# Patient Record
Sex: FEMALE | Race: ASIAN
[De-identification: names, ages, dates, MRNs, and addresses within clinical notes are randomized per-mention and may not be internally consistent; named-entity substitution may affect disease eponyms.]

---

## 2017-10-30 ENCOUNTER — HOSPITAL ENCOUNTER (EMERGENCY)
Dept: HOSPITAL 74 - FER | Age: 42
Discharge: HOME | End: 2017-10-30
Payer: COMMERCIAL

## 2017-10-30 VITALS — BODY MASS INDEX: 34.9 KG/M2

## 2017-10-30 VITALS — DIASTOLIC BLOOD PRESSURE: 88 MMHG | SYSTOLIC BLOOD PRESSURE: 138 MMHG | TEMPERATURE: 99.1 F | HEART RATE: 81 BPM

## 2017-10-30 DIAGNOSIS — M54.30: Primary | ICD-10-CM

## 2017-10-30 DIAGNOSIS — F17.210: ICD-10-CM

## 2017-10-30 DIAGNOSIS — G89.29: ICD-10-CM

## 2017-10-30 LAB
COLOR UR: YELLOW
PH UR: 5.5 [PH] (ref 4.5–8)
SP GR UR: <= 1.005 (ref 1–1.02)
UROBILINOGEN UR STRIP-MCNC: 0.2 MG/DL (ref 0.2–1)

## 2017-10-30 NOTE — PDOC
History of Present Illness





- General


Chief Complaint: Pain, Acute


Stated Complaint: LOW BACK PAIN


Time Seen by Provider: 10/30/17 09:46


History Source: Patient


Exam Limitations: No Limitations





- History of Present Illness


Initial Comments: 





10/30/17 10:35


42 F with h/o low back pain / injury intermittent every few months, here today c

/o severe low back pain radiating down through buttock. no known injury, 

believes she strained it mopping and sleeping in a different bed. no new 

numbness or tingling. no bowel or bladder incontinence. no f/c no urinary 

complaints. pain severe. mild improvement with 400 mg motrin at home.  no other 

complaints. has had imaging many years ago and was told she has disc disease in 

low back.





Past History





- Past Medical History


Allergies/Adverse Reactions: 


 Allergies











Allergy/AdvReac Type Severity Reaction Status Date / Time


 


No Known Allergies Allergy   Verified 10/30/17 09:46











Home Medications: 


Ambulatory Orders





No Home Medications 0 dose .ROUTE UTDICT 04/23/14 


Diazepam [Valium] 5 mg PO Q8H PRN #10 tablet MDD 3 10/30/17 


Ibuprofen [Motrin -] 600 mg PO TID #90 tablet 10/30/17 











- Suicide/Smoking/Psychosocial Hx


Smoking History: Current every day smoker


Number of Cigarettes Smoked Daily: 5


Information on smoking cessation initiated: Yes


'Breaking Loose' booklet given: 10/30/17


Hx Alcohol Use: No


Drug/Substance Use Hx: No


Substance Use Type: None





**Review of Systems





- Review of Systems


Constitutional: No: Chills, Diaphoresis, Fever


Respiratory: No: Cough, Orthopnea


Cardiac (ROS): No: Chest Pain, Edema


: No: Burning, Dysuria


Musculoskeletal: Yes: Back Pain.  No: Joint Pain


Neurological: No: Numbness, Paresthesia, Weakness


All Other Systems: Reviewed and Negative





*Physical Exam





- Vital Signs


 Last Vital Signs











Temp Pulse Resp BP Pulse Ox


 


 99.1 F   81   17   138/88   100 


 


 10/30/17 09:45  10/30/17 09:45  10/30/17 09:45  10/30/17 09:45  10/30/17 09:45














- Physical Exam


General Appearance: Yes: Nourished, Appropriately Dressed


Neck: positive: Trachea midline


Respiratory/Chest: positive: Lungs Clear, Normal Breath Sounds.  negative: 

Respiratory Distress


Cardiovascular: positive: Regular Rhythm, Regular Rate, S1, S2.  negative: Edema


Gastrointestinal/Abdominal: positive: Normal Bowel Sounds, Flat, Soft.  negative

: Tender


Musculoskeletal: positive: Normal Inspection, Other (bilat lower extremity 

strenth 5/5 hip flext / ext kne ext, DFPF. sensation intact bilaterally. ).  

negative: CVA Tenderness


Extremity: positive: Normal Capillary Refill


Integumentary: positive: Dry, Warm


Neurologic: positive: CNs II-XII NML intact, Fully Oriented, Alert, Normal Mood/

Affect





ED Treatment Course





- ADDITIONAL ORDERS


Additional order review: 


 Laboratory  Results











  10/30/17 10/30/17





  09:46 09:46


 


Urine Color  Yellow 


 


Urine Appearance  Clear 


 


Urine pH  5.5 


 


Ur Specific Gravity  <= 1.005 


 


Urine Protein  Negative 


 


Urine Glucose (UA)  Negative 


 


Urine Ketones  Negative 


 


Urine Blood  Negative 


 


Urine Nitrite  Negative 


 


Urine Bilirubin  Negative 


 


Urine Urobilinogen  0.2 


 


Ur Leukocyte Esterase  Negative 


 


Urine HCG, Qual   Negative














- Medications


Given in the ED: 


ED Medications














Discontinued Medications














Generic Name Dose Route Start Last Admin





  Trade Name Freq  PRN Reason Stop Dose Admin


 


Diazepam  5 mg 10/30/17 10:15 10/30/17 10:30





  Valium -  PO 10/30/17 10:16  5 mg





  ONCE ONE   Administration


 


Ibuprofen  200 mg 10/30/17 10:16 10/30/17 10:30





  Advil -  PO 10/30/17 10:17  200 mg





  ONCE ONE   Administration


 


Oxycodone/Acetaminophen  1 combo 10/30/17 10:15 10/30/17 10:30





  Percocet 5/325 -  PO 10/30/17 10:16  1 combo





  ONCE ONE   Administration














Medical Decision Making





- Medical Decision Making





10/30/17 10:38


pt ambulating with out diffiuclty.  pain improved will dc on valium and motrin. 

follow up outpt pt and pcp.





*DC/Admit/Observation/Transfer


Diagnosis at time of Disposition: 


 Sciatica





- Discharge Dispostion


Condition at time of disposition: Stable





- Prescriptions


Prescriptions: 


Ibuprofen [Motrin -] 600 mg PO TID #90 tablet


Diazepam [Valium] 5 mg PO Q8H PRN #10 tablet MDD 3


 PRN Reason: Muscle Spasms





- Patient Instructions


Printed Discharge Instructions:  Sciatica


Additional Instructions: 


take motrin 600 mg every 8 hours as needed for pain. take with food. take 

valium 5 mg every 8 hours as needed for muscle spasm. do not drive after 

thismedication, or mix with alcohol as it can make you sleepy. return for any 

numbness , weakness or any concerns. no heavy lifting for one week. you will 

have pain for several days. follow up with your regular doctor should your 

symptoms persist for an outpatient physical therapy and a MRI as your doctor 

feels necessary.

## 2021-07-04 ENCOUNTER — HOSPITAL ENCOUNTER (EMERGENCY)
Dept: HOSPITAL 74 - JERFT | Age: 46
Discharge: HOME | End: 2021-07-04
Payer: COMMERCIAL

## 2021-07-04 VITALS — BODY MASS INDEX: 33.3 KG/M2

## 2021-07-04 VITALS — DIASTOLIC BLOOD PRESSURE: 85 MMHG | SYSTOLIC BLOOD PRESSURE: 119 MMHG | TEMPERATURE: 97.4 F | HEART RATE: 98 BPM

## 2021-07-04 DIAGNOSIS — M54.41: Primary | ICD-10-CM

## 2021-07-04 DIAGNOSIS — G89.29: ICD-10-CM

## 2021-07-04 DIAGNOSIS — M54.31: ICD-10-CM

## 2023-05-30 ENCOUNTER — HOSPITAL ENCOUNTER (EMERGENCY)
Dept: HOSPITAL 74 - JERFT | Age: 48
Discharge: HOME | End: 2023-05-30
Payer: COMMERCIAL

## 2023-05-30 VITALS
TEMPERATURE: 98.5 F | DIASTOLIC BLOOD PRESSURE: 78 MMHG | SYSTOLIC BLOOD PRESSURE: 125 MMHG | RESPIRATION RATE: 18 BRPM | HEART RATE: 80 BPM

## 2023-05-30 VITALS — BODY MASS INDEX: 24.1 KG/M2
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
## 2023-05-30 DIAGNOSIS — Z00.00: ICD-10-CM
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
## 2023-05-30 DIAGNOSIS — S09.90XA: Primary | ICD-10-CM
